# Patient Record
Sex: FEMALE | Race: WHITE | NOT HISPANIC OR LATINO
[De-identification: names, ages, dates, MRNs, and addresses within clinical notes are randomized per-mention and may not be internally consistent; named-entity substitution may affect disease eponyms.]

---

## 2022-09-21 ENCOUNTER — RESULT REVIEW (OUTPATIENT)
Age: 67
End: 2022-09-21

## 2022-11-09 ENCOUNTER — APPOINTMENT (OUTPATIENT)
Dept: RADIATION ONCOLOGY | Facility: CLINIC | Age: 67
End: 2022-11-09

## 2023-02-23 ENCOUNTER — APPOINTMENT (OUTPATIENT)
Dept: FAMILY MEDICINE | Facility: CLINIC | Age: 68
End: 2023-02-23
Payer: MEDICARE

## 2023-02-23 ENCOUNTER — LABORATORY RESULT (OUTPATIENT)
Age: 68
End: 2023-02-23

## 2023-02-23 VITALS
SYSTOLIC BLOOD PRESSURE: 124 MMHG | OXYGEN SATURATION: 98 % | DIASTOLIC BLOOD PRESSURE: 82 MMHG | WEIGHT: 159 LBS | HEIGHT: 65 IN | HEART RATE: 89 BPM | TEMPERATURE: 98 F | BODY MASS INDEX: 26.49 KG/M2

## 2023-02-23 DIAGNOSIS — Z00.00 ENCOUNTER FOR GENERAL ADULT MEDICAL EXAMINATION W/OUT ABNORMAL FINDINGS: ICD-10-CM

## 2023-02-23 DIAGNOSIS — Z13.29 ENCOUNTER FOR SCREENING FOR OTHER SUSPECTED ENDOCRINE DISORDER: ICD-10-CM

## 2023-02-23 DIAGNOSIS — Z13.0 ENCOUNTER FOR SCREENING FOR OTHER SUSPECTED ENDOCRINE DISORDER: ICD-10-CM

## 2023-02-23 DIAGNOSIS — Z13.228 ENCOUNTER FOR SCREENING FOR OTHER SUSPECTED ENDOCRINE DISORDER: ICD-10-CM

## 2023-02-23 PROCEDURE — 99204 OFFICE O/P NEW MOD 45 MIN: CPT

## 2023-02-23 RX ORDER — SITAGLIPTIN AND METFORMIN HYDROCHLORIDE 50; 1000 MG/1; MG/1
50-1000 TABLET, FILM COATED, EXTENDED RELEASE ORAL
Refills: 0 | Status: DISCONTINUED | COMMUNITY
End: 2023-02-23

## 2023-02-23 NOTE — HISTORY OF PRESENT ILLNESS
[de-identified] : PT presenting to establish care \par \par Anxiety- venlafaxine 75 mg daily\par [id number] BID  DM, stable. \par metoprolol- HR elevated \par \par Just recently had a b/l masectomy-> needs to go for reconstruction \par 2001 breast cancer on left, s/p chemo and radiation \par then had reoccurrence of breast cancer in b/l breasts in NOv 2022\par going for reconstruction in approx 3 months\par

## 2023-02-23 NOTE — ASSESSMENT
[FreeTextEntry1] : Pt presenting for annual physical. \par \par Reviewed diet, exercise (150 min/moderate intensity exercise weekly), lifestyle modifications. \par Discussed STD prevention and discussed screening studies per below:\par \par Breast Cancer- Mammogram\par Cervical Cancer- Pap\par Colon Cancer- GI referral \par \par Labs ordered per above. \par f.u in 1 year or earlier if needed \par \par \par RTO for preop clearance for reconstruction

## 2023-02-23 NOTE — HEALTH RISK ASSESSMENT
[Good] : ~his/her~  mood as  good [No] : In the past 12 months have you used drugs other than those required for medical reasons? No [No falls in past year] : Patient reported no falls in the past year [0] : 2) Feeling down, depressed, or hopeless: Not at all (0) [PHQ-2 Negative - No further assessment needed] : PHQ-2 Negative - No further assessment needed [Patient reported mammogram was abnormal] : Patient reported mammogram was abnormal [Patient reported PAP Smear was normal] : Patient reported PAP Smear was normal [Patient reported colonoscopy was normal] : Patient reported colonoscopy was normal [Never] : Never [ColonoscopyDate] : 2020 [ColonoscopyComments] : Q3 years

## 2023-03-02 LAB
25(OH)D3 SERPL-MCNC: 86.8 NG/ML
ALBUMIN SERPL ELPH-MCNC: 4.7 G/DL
ALP BLD-CCNC: 95 U/L
ALT SERPL-CCNC: 21 U/L
ANION GAP SERPL CALC-SCNC: 15 MMOL/L
APPEARANCE: CLEAR
AST SERPL-CCNC: 21 U/L
BASOPHILS # BLD AUTO: 0.07 K/UL
BASOPHILS NFR BLD AUTO: 0.9 %
BILIRUB SERPL-MCNC: 0.4 MG/DL
BILIRUBIN URINE: NEGATIVE
BLOOD URINE: NEGATIVE
BUN SERPL-MCNC: 14 MG/DL
CALCIUM SERPL-MCNC: 10.5 MG/DL
CHLORIDE SERPL-SCNC: 100 MMOL/L
CHOLEST SERPL-MCNC: 162 MG/DL
CO2 SERPL-SCNC: 23 MMOL/L
COLOR: YELLOW
CREAT SERPL-MCNC: 0.77 MG/DL
EGFR: 83 ML/MIN/1.73M2
EOSINOPHIL # BLD AUTO: 0.37 K/UL
EOSINOPHIL NFR BLD AUTO: 4.6 %
ESTIMATED AVERAGE GLUCOSE: 174 MG/DL
GLUCOSE QUALITATIVE U: ABNORMAL
GLUCOSE SERPL-MCNC: 145 MG/DL
HBA1C MFR BLD HPLC: 7.7 %
HCT VFR BLD CALC: 44.8 %
HDLC SERPL-MCNC: 51 MG/DL
HGB BLD-MCNC: 14.3 G/DL
IMM GRANULOCYTES NFR BLD AUTO: 0.2 %
KETONES URINE: NEGATIVE
LDLC SERPL CALC-MCNC: 86 MG/DL
LEUKOCYTE ESTERASE URINE: ABNORMAL
LYMPHOCYTES # BLD AUTO: 2.51 K/UL
LYMPHOCYTES NFR BLD AUTO: 31.2 %
MAN DIFF?: NORMAL
MCHC RBC-ENTMCNC: 28.9 PG
MCHC RBC-ENTMCNC: 31.9 GM/DL
MCV RBC AUTO: 90.5 FL
MONOCYTES # BLD AUTO: 0.63 K/UL
MONOCYTES NFR BLD AUTO: 7.8 %
NEUTROPHILS # BLD AUTO: 4.44 K/UL
NEUTROPHILS NFR BLD AUTO: 55.3 %
NITRITE URINE: NEGATIVE
NONHDLC SERPL-MCNC: 111 MG/DL
PH URINE: 5.5
PLATELET # BLD AUTO: 391 K/UL
POTASSIUM SERPL-SCNC: 4.5 MMOL/L
PROT SERPL-MCNC: 7.3 G/DL
PROTEIN URINE: NORMAL
RBC # BLD: 4.95 M/UL
RBC # FLD: 13.8 %
SODIUM SERPL-SCNC: 137 MMOL/L
SPECIFIC GRAVITY URINE: 1.02
T3 SERPL-MCNC: 157 NG/DL
T4 FREE SERPL-MCNC: 1 NG/DL
TRIGL SERPL-MCNC: 124 MG/DL
TSH SERPL-ACNC: 2.19 UIU/ML
UROBILINOGEN URINE: NORMAL
WBC # FLD AUTO: 8.04 K/UL

## 2023-03-08 RX ORDER — METOPROLOL TARTRATE 50 MG/1
50 TABLET, FILM COATED ORAL DAILY
Qty: 90 | Refills: 1 | Status: DISCONTINUED | COMMUNITY
Start: 1900-01-01 | End: 2023-03-08

## 2023-06-05 ENCOUNTER — APPOINTMENT (OUTPATIENT)
Dept: FAMILY MEDICINE | Facility: CLINIC | Age: 68
End: 2023-06-05
Payer: MEDICARE

## 2023-06-05 VITALS
HEIGHT: 65 IN | TEMPERATURE: 97.3 F | SYSTOLIC BLOOD PRESSURE: 112 MMHG | BODY MASS INDEX: 25.16 KG/M2 | DIASTOLIC BLOOD PRESSURE: 62 MMHG | WEIGHT: 151 LBS | HEART RATE: 106 BPM | OXYGEN SATURATION: 98 %

## 2023-06-05 DIAGNOSIS — Z82.49 FAMILY HISTORY OF ISCHEMIC HEART DISEASE AND OTHER DISEASES OF THE CIRCULATORY SYSTEM: ICD-10-CM

## 2023-06-05 DIAGNOSIS — Z79.4 DIABETES MELLITUS DUE TO UNDERLYING CONDITION WITH OTHER DIABETIC KIDNEY COMPLICATION: ICD-10-CM

## 2023-06-05 DIAGNOSIS — R80.9 DIABETES MELLITUS DUE TO UNDERLYING CONDITION WITH OTHER DIABETIC KIDNEY COMPLICATION: ICD-10-CM

## 2023-06-05 DIAGNOSIS — Z85.3 PERSONAL HISTORY OF MALIGNANT NEOPLASM OF BREAST: ICD-10-CM

## 2023-06-05 DIAGNOSIS — Z78.9 OTHER SPECIFIED HEALTH STATUS: ICD-10-CM

## 2023-06-05 DIAGNOSIS — E08.29 DIABETES MELLITUS DUE TO UNDERLYING CONDITION WITH OTHER DIABETIC KIDNEY COMPLICATION: ICD-10-CM

## 2023-06-05 DIAGNOSIS — Z80.8 FAMILY HISTORY OF MALIGNANT NEOPLASM OF OTHER ORGANS OR SYSTEMS: ICD-10-CM

## 2023-06-05 DIAGNOSIS — Z87.891 PERSONAL HISTORY OF NICOTINE DEPENDENCE: ICD-10-CM

## 2023-06-05 LAB — GLUCOSE BLDC GLUCOMTR-MCNC: 160

## 2023-06-05 PROCEDURE — 99214 OFFICE O/P EST MOD 30 MIN: CPT | Mod: 25

## 2023-06-05 PROCEDURE — 82962 GLUCOSE BLOOD TEST: CPT

## 2023-06-05 NOTE — PHYSICAL EXAM
[No Acute Distress] : no acute distress [Well Nourished] : well nourished [Well Developed] : well developed [Well-Appearing] : well-appearing [Normal Sclera/Conjunctiva] : normal sclera/conjunctiva [PERRL] : pupils equal round and reactive to light [EOMI] : extraocular movements intact [Normal Appearance] : was normal in appearance [Neck Supple] : was supple [No Respiratory Distress] : no respiratory distress  [Clear to Auscultation] : lungs were clear to auscultation bilaterally [Normal Rate] : normal rate  [Regular Rhythm] : with a regular rhythm [Normal S1, S2] : normal S1 and S2 [No Murmur] : no murmur heard [Alert and Oriented x3] : oriented to person, place, and time [Normal Mood] : the mood was normal

## 2023-06-06 ENCOUNTER — OFFICE (OUTPATIENT)
Dept: URBAN - METROPOLITAN AREA CLINIC 102 | Facility: CLINIC | Age: 68
Setting detail: OPHTHALMOLOGY
End: 2023-06-06
Payer: MEDICARE

## 2023-06-06 DIAGNOSIS — H49.22: ICD-10-CM

## 2023-06-06 DIAGNOSIS — H25.13: ICD-10-CM

## 2023-06-06 DIAGNOSIS — H43.813: ICD-10-CM

## 2023-06-06 DIAGNOSIS — E10.9: ICD-10-CM

## 2023-06-06 DIAGNOSIS — E11.9: ICD-10-CM

## 2023-06-06 LAB
ALBUMIN SERPL ELPH-MCNC: 4.4 G/DL
ALP BLD-CCNC: 88 U/L
ALT SERPL-CCNC: 26 U/L
ANION GAP SERPL CALC-SCNC: 16 MMOL/L
APPEARANCE: CLEAR
AST SERPL-CCNC: 32 U/L
BACTERIA: NEGATIVE /HPF
BILIRUB SERPL-MCNC: 0.3 MG/DL
BILIRUBIN URINE: NEGATIVE
BLOOD URINE: NEGATIVE
BUN SERPL-MCNC: 19 MG/DL
CALCIUM SERPL-MCNC: 9.7 MG/DL
CAST: 1 /LPF
CHLORIDE SERPL-SCNC: 103 MMOL/L
CO2 SERPL-SCNC: 21 MMOL/L
COLOR: NORMAL
CREAT SERPL-MCNC: 0.85 MG/DL
CREAT SPEC-SCNC: 250 MG/DL
EGFR: 74 ML/MIN/1.73M2
EPITHELIAL CELLS: 10 /HPF
ESTIMATED AVERAGE GLUCOSE: 223 MG/DL
GLUCOSE QUALITATIVE U: NEGATIVE MG/DL
GLUCOSE SERPL-MCNC: 201 MG/DL
HBA1C MFR BLD HPLC: 9.4 %
KETONES URINE: ABNORMAL MG/DL
LEUKOCYTE ESTERASE URINE: ABNORMAL
MICROALBUMIN 24H UR DL<=1MG/L-MCNC: 2.4 MG/DL
MICROALBUMIN/CREAT 24H UR-RTO: 10 MG/G
MICROSCOPIC-UA: NORMAL
NITRITE URINE: NEGATIVE
PH URINE: 5.5
POTASSIUM SERPL-SCNC: 4.1 MMOL/L
PROT SERPL-MCNC: 7.1 G/DL
PROTEIN URINE: 30 MG/DL
RED BLOOD CELLS URINE: 0 /HPF
SODIUM SERPL-SCNC: 140 MMOL/L
SPECIFIC GRAVITY URINE: 1.03
UROBILINOGEN URINE: 1 MG/DL
WHITE BLOOD CELLS URINE: 36 /HPF

## 2023-06-06 PROCEDURE — 92020 GONIOSCOPY: CPT | Performed by: OPHTHALMOLOGY

## 2023-06-06 PROCEDURE — 92014 COMPRE OPH EXAM EST PT 1/>: CPT | Performed by: OPHTHALMOLOGY

## 2023-06-06 ASSESSMENT — AXIALLENGTH_DERIVED
OD_AL: 22.496
OD_AL: 22.9017
OD_AL: 22.496
OS_AL: 22.4204
OS_AL: 22.9609
OS_AL: 22.4204

## 2023-06-06 ASSESSMENT — TONOMETRY
OS_IOP_MMHG: 21
OD_IOP_MMHG: 21

## 2023-06-06 ASSESSMENT — REFRACTION_CURRENTRX
OS_VPRISM_DIRECTION: SV
OS_SPHERE: +2.75
OD_AXIS: 060
OD_AXIS: 61
OS_SPHERE: +1.25
OS_CYLINDER: -0.25
OD_CYLINDER: -1.00
OS_ADD: +2.25
OS_CYLINDER: -1.00
OD_SPHERE: +1.50
OS_OVR_VA: 20/
OS_AXIS: 080
OD_OVR_VA: 20/
OD_CYLINDER: -0.50
OD_OVR_VA: 20/
OS_AXIS: 69
OD_VPRISM_DIRECTION: SV
OD_ADD: +2.25
OD_SPHERE: +3.75
OS_OVR_VA: 20/

## 2023-06-06 ASSESSMENT — REFRACTION_AUTOREFRACTION
OS_AXIS: 097
OS_CYLINDER: -1.50
OD_CYLINDER: -1.25
OD_AXIS: 081
OS_SPHERE: +2.50
OD_SPHERE: +2.75

## 2023-06-06 ASSESSMENT — CONFRONTATIONAL VISUAL FIELD TEST (CVF)
OS_FINDINGS: FULL
OD_FINDINGS: FULL

## 2023-06-06 ASSESSMENT — VISUAL ACUITY
OD_BCVA: 20/40-1
OS_BCVA: 20/40-1

## 2023-06-06 ASSESSMENT — SPHEQUIV_DERIVED
OS_SPHEQUIV: 1.75
OS_SPHEQUIV: 0.25
OD_SPHEQUIV: 2.125
OD_SPHEQUIV: 1
OD_SPHEQUIV: 2.125
OS_SPHEQUIV: 1.75

## 2023-06-06 ASSESSMENT — KERATOMETRY
OD_K1POWER_DIOPTERS: 44.00
OD_K2POWER_DIOPTERS: 44.75
OS_AXISANGLE_DEGREES: 153
OD_AXISANGLE_DEGREES: 017
METHOD_AUTO_MANUAL: AUTO
OS_K2POWER_DIOPTERS: 45.50
OS_K1POWER_DIOPTERS: 44.50

## 2023-06-06 ASSESSMENT — REFRACTION_MANIFEST
OD_AXIS: 060
OD_CYLINDER: -0.50
OD_SPHERE: +1.25
OD_VA1: 20/25-
OS_SPHERE: +2.50
OS_SPHERE: +0.50
OS_AXIS: 097
OS_VA1: 20/25
OD_SPHERE: +2.75
OD_ADD: +2.50
OS_ADD: +2.50
OS_CYLINDER: -0.50
OD_VA1: 20/25
OD_CYLINDER: -1.25
OS_CYLINDER: -1.50
OS_VA1: 20/25-
OD_AXIS: 081
OS_AXIS: 090

## 2023-06-13 ENCOUNTER — RX CHANGE (OUTPATIENT)
Age: 68
End: 2023-06-13

## 2023-06-13 ENCOUNTER — APPOINTMENT (OUTPATIENT)
Dept: FAMILY MEDICINE | Facility: CLINIC | Age: 68
End: 2023-06-13

## 2023-06-13 RX ORDER — ONDANSETRON 4 MG/1
4 TABLET, ORALLY DISINTEGRATING ORAL EVERY 6 HOURS
Qty: 15 | Refills: 1 | Status: ACTIVE | COMMUNITY
Start: 2023-06-13 | End: 1900-01-01

## 2023-06-14 ENCOUNTER — RX CHANGE (OUTPATIENT)
Age: 68
End: 2023-06-14

## 2023-06-14 RX ORDER — VENLAFAXINE 75 MG/1
75 TABLET ORAL DAILY
Qty: 90 | Refills: 0 | Status: DISCONTINUED | COMMUNITY
End: 2023-06-14

## 2023-06-16 ENCOUNTER — OFFICE (OUTPATIENT)
Dept: URBAN - METROPOLITAN AREA CLINIC 115 | Facility: CLINIC | Age: 68
Setting detail: OPHTHALMOLOGY
End: 2023-06-16
Payer: MEDICARE

## 2023-06-16 DIAGNOSIS — H49.22: ICD-10-CM

## 2023-06-16 DIAGNOSIS — H53.433: ICD-10-CM

## 2023-06-16 DIAGNOSIS — H25.13: ICD-10-CM

## 2023-06-16 DIAGNOSIS — E11.9: ICD-10-CM

## 2023-06-16 DIAGNOSIS — E10.9: ICD-10-CM

## 2023-06-16 DIAGNOSIS — H50.00: ICD-10-CM

## 2023-06-16 DIAGNOSIS — H43.813: ICD-10-CM

## 2023-06-16 PROCEDURE — 99214 OFFICE O/P EST MOD 30 MIN: CPT | Performed by: OPHTHALMOLOGY

## 2023-06-16 PROCEDURE — 92083 EXTENDED VISUAL FIELD XM: CPT | Performed by: OPHTHALMOLOGY

## 2023-06-16 PROCEDURE — 92133 CPTRZD OPH DX IMG PST SGM ON: CPT | Performed by: OPHTHALMOLOGY

## 2023-06-16 ASSESSMENT — SPHEQUIV_DERIVED
OD_SPHEQUIV: 1
OS_SPHEQUIV: 0.25
OD_SPHEQUIV: 2
OS_SPHEQUIV: 1.5
OS_SPHEQUIV: 1.75
OD_SPHEQUIV: 2.125

## 2023-06-16 ASSESSMENT — REFRACTION_AUTOREFRACTION
OD_CYLINDER: -1.00
OS_AXIS: 089
OD_AXIS: 072
OS_SPHERE: +2.25
OD_SPHERE: +2.50
OS_CYLINDER: -1.50

## 2023-06-16 ASSESSMENT — REFRACTION_MANIFEST
OD_SPHERE: +1.25
OS_VA1: 20/25
OS_VA1: 20/25-
OD_ADD: +2.50
OD_AXIS: 081
OD_SPHERE: +2.75
OS_SPHERE: +2.50
OS_CYLINDER: -0.50
OS_CYLINDER: -1.50
OS_AXIS: 097
OD_CYLINDER: -1.25
OD_VA1: 20/25
OS_SPHERE: +0.50
OD_CYLINDER: -0.50
OD_VA1: 20/25-
OS_AXIS: 090
OS_ADD: +2.50
OD_AXIS: 060

## 2023-06-16 ASSESSMENT — AXIALLENGTH_DERIVED
OD_AL: 22.496
OS_AL: 22.9609
OS_AL: 22.5087
OS_AL: 22.4204
OD_AL: 22.5404
OD_AL: 22.9017

## 2023-06-16 ASSESSMENT — REFRACTION_CURRENTRX
OS_AXIS: 080
OD_VPRISM_DIRECTION: SV
OD_AXIS: 060
OD_OVR_VA: 20/
OS_OVR_VA: 20/
OS_ADD: +2.25
OS_CYLINDER: -0.25
OD_SPHERE: +3.75
OS_SPHERE: +2.75
OD_ADD: +2.25
OD_OVR_VA: 20/
OD_SPHERE: +1.50
OD_AXIS: 61
OS_OVR_VA: 20/
OS_VPRISM_DIRECTION: SV
OS_CYLINDER: -1.00
OS_SPHERE: +1.25
OS_AXIS: 69
OD_CYLINDER: -1.00
OD_CYLINDER: -0.50

## 2023-06-16 ASSESSMENT — KERATOMETRY
OS_AXISANGLE_DEGREES: 153
OS_K2POWER_DIOPTERS: 45.50
OS_K1POWER_DIOPTERS: 44.50
OD_K2POWER_DIOPTERS: 44.75
METHOD_AUTO_MANUAL: AUTO
OD_K1POWER_DIOPTERS: 44.00
OD_AXISANGLE_DEGREES: 017

## 2023-06-16 ASSESSMENT — VISUAL ACUITY
OS_BCVA: 20/20-
OD_BCVA: 20/60-

## 2023-06-16 ASSESSMENT — CONFRONTATIONAL VISUAL FIELD TEST (CVF)
OD_FINDINGS: FULL
OS_FINDINGS: FULL

## 2023-06-16 ASSESSMENT — TONOMETRY
OD_IOP_MMHG: 20
OS_IOP_MMHG: 20

## 2023-06-19 NOTE — ASSESSMENT
[FreeTextEntry1] : POCT glucose 160- stable\par currently on Janumet for diabetes- last A1C 7.7, not controlled\par will check blood work, reassess \par improve upon diet and exercise\par scheduled to see ophthalmology \par BP stable- c/w Metoprolol

## 2023-06-19 NOTE — HISTORY OF PRESENT ILLNESS
[FreeTextEntry8] : \par 68 year old female with HTN, Hyperlipidemia, DM II, Anxiety, Breast Cancer s/p lumpectomy, chemo/radiation, had recurrence of cancer s/p bilateral mastectomy, follows with Dr. Mccann, oncology, presents c/o blurry vision for the past 2 days. She reports having double vision. She is scheduled to see her ophthalmologist tomorrow. She is concerned about her sugars. She has been taking Janumet for her diabetes-last A1C 7.7 from Feb 2023. She reports her diet has not been great over the past of couple months. She has had bilateral mastectomy, has expanders in place, will need reconstructive surgery.

## 2023-06-28 PROBLEM — E11.9 DIABETES TYPE 2 NO RETINOPATHY ; BOTH EYES: Status: ACTIVE | Noted: 2023-06-06

## 2023-06-28 PROBLEM — H50.00 ESOTROPIA, UNSPECIFIED: Status: ACTIVE | Noted: 2023-06-16

## 2023-06-28 PROBLEM — H53.433 ARCUATE DEFECT; BOTH EYES: Status: ACTIVE | Noted: 2023-06-16

## 2023-06-28 PROBLEM — H43.813 POSTERIOR VITREOUS DETACHMENT; BOTH EYES: Status: ACTIVE | Noted: 2023-06-06

## 2023-07-12 ENCOUNTER — OFFICE (OUTPATIENT)
Dept: URBAN - METROPOLITAN AREA CLINIC 115 | Facility: CLINIC | Age: 68
Setting detail: OPHTHALMOLOGY
End: 2023-07-12
Payer: MEDICARE

## 2023-07-12 DIAGNOSIS — H25.13: ICD-10-CM

## 2023-07-12 DIAGNOSIS — E10.9: ICD-10-CM

## 2023-07-12 DIAGNOSIS — H49.22: ICD-10-CM

## 2023-07-12 DIAGNOSIS — H50.00: ICD-10-CM

## 2023-07-12 DIAGNOSIS — H53.433: ICD-10-CM

## 2023-07-12 DIAGNOSIS — H47.322: ICD-10-CM

## 2023-07-12 PROCEDURE — 92250 FUNDUS PHOTOGRAPHY W/I&R: CPT | Performed by: OPHTHALMOLOGY

## 2023-07-12 PROCEDURE — 99213 OFFICE O/P EST LOW 20 MIN: CPT | Performed by: OPHTHALMOLOGY

## 2023-07-12 PROCEDURE — 92083 EXTENDED VISUAL FIELD XM: CPT | Performed by: OPHTHALMOLOGY

## 2023-07-12 ASSESSMENT — AXIALLENGTH_DERIVED
OS_AL: 22.6874
OD_AL: 22.9017
OD_AL: 22.496
OS_AL: 22.9609
OD_AL: 22.5404
OS_AL: 22.4204

## 2023-07-12 ASSESSMENT — REFRACTION_CURRENTRX
OS_OVR_VA: 20/
OD_CYLINDER: -0.50
OS_VPRISM_DIRECTION: SV
OD_SPHERE: +3.75
OS_CYLINDER: -0.25
OS_CYLINDER: -1.00
OD_ADD: +2.25
OS_ADD: +2.25
OD_OVR_VA: 20/
OD_OVR_VA: 20/
OS_SPHERE: +1.25
OS_AXIS: 69
OS_SPHERE: +2.75
OD_CYLINDER: -1.00
OD_AXIS: 060
OS_AXIS: 080
OD_SPHERE: +1.50
OD_VPRISM_DIRECTION: SV
OD_AXIS: 61
OS_OVR_VA: 20/

## 2023-07-12 ASSESSMENT — REFRACTION_AUTOREFRACTION
OS_CYLINDER: -0.50
OD_CYLINDER: -1.00
OS_SPHERE: +1.25
OD_SPHERE: +2.50
OS_AXIS: 104
OD_AXIS: 076

## 2023-07-12 ASSESSMENT — VISUAL ACUITY
OD_BCVA: 20/60-
OS_BCVA: 20/20-

## 2023-07-12 ASSESSMENT — SPHEQUIV_DERIVED
OS_SPHEQUIV: 1.75
OS_SPHEQUIV: 0.25
OD_SPHEQUIV: 2
OS_SPHEQUIV: 1
OD_SPHEQUIV: 1
OD_SPHEQUIV: 2.125

## 2023-07-12 ASSESSMENT — REFRACTION_MANIFEST
OD_SPHERE: +2.75
OS_VA1: 20/25-
OS_CYLINDER: -1.50
OS_VA1: 20/25
OD_VA1: 20/25-
OS_ADD: +2.50
OS_SPHERE: +0.50
OD_AXIS: 060
OS_AXIS: 097
OD_VA1: 20/25
OD_SPHERE: +1.25
OD_ADD: +2.50
OD_AXIS: 081
OD_CYLINDER: -0.50
OS_CYLINDER: -0.50
OS_AXIS: 090
OS_SPHERE: +2.50
OD_CYLINDER: -1.25

## 2023-07-12 ASSESSMENT — CONFRONTATIONAL VISUAL FIELD TEST (CVF)
OD_FINDINGS: FULL
OS_FINDINGS: FULL

## 2023-07-12 ASSESSMENT — KERATOMETRY
OS_K2POWER_DIOPTERS: 45.50
METHOD_AUTO_MANUAL: AUTO
OD_K1POWER_DIOPTERS: 44.00
OS_AXISANGLE_DEGREES: 153
OS_K1POWER_DIOPTERS: 44.50
OD_AXISANGLE_DEGREES: 017
OD_K2POWER_DIOPTERS: 44.75

## 2023-07-14 ENCOUNTER — APPOINTMENT (OUTPATIENT)
Dept: FAMILY MEDICINE | Facility: CLINIC | Age: 68
End: 2023-07-14
Payer: MEDICARE

## 2023-07-14 VITALS
TEMPERATURE: 97.4 F | HEIGHT: 65 IN | WEIGHT: 150 LBS | OXYGEN SATURATION: 98 % | BODY MASS INDEX: 24.99 KG/M2 | DIASTOLIC BLOOD PRESSURE: 70 MMHG | SYSTOLIC BLOOD PRESSURE: 118 MMHG | HEART RATE: 89 BPM

## 2023-07-14 DIAGNOSIS — H53.8 OTHER VISUAL DISTURBANCES: ICD-10-CM

## 2023-07-14 PROCEDURE — 99214 OFFICE O/P EST MOD 30 MIN: CPT

## 2023-07-14 NOTE — ASSESSMENT
[FreeTextEntry1] : uncontrolled DM \par discussed lifestyle changes \par had eye exam, blurry vision is not 2/2 DM at this time however discussed long term effects of this. \par \par Increase mounjaro to 5 mg weekly\par pt tolerating well\par f/u in 3 months for A1c check and mounjaro refill. \par

## 2023-07-14 NOTE — HISTORY OF PRESENT ILLNESS
[de-identified] : PT presenting for f/u \par \par Since last visit, has developed double vision\par had spot on front of head via MRI and was recommended to see neurosurgeon recommended by Ophtho\par fam hx of AVM, \par \par Currently on Mounjaro, last A1c was 9.4, increased from 7.7 in Feb

## 2023-07-28 ENCOUNTER — NON-APPOINTMENT (OUTPATIENT)
Age: 68
End: 2023-07-28

## 2023-07-29 ENCOUNTER — TRANSCRIPTION ENCOUNTER (OUTPATIENT)
Age: 68
End: 2023-07-29

## 2023-08-08 ENCOUNTER — RX RENEWAL (OUTPATIENT)
Age: 68
End: 2023-08-08

## 2023-08-16 ENCOUNTER — APPOINTMENT (OUTPATIENT)
Dept: SPINE | Facility: CLINIC | Age: 68
End: 2023-08-16
Payer: MEDICARE

## 2023-08-16 VITALS
HEIGHT: 65 IN | BODY MASS INDEX: 24.99 KG/M2 | DIASTOLIC BLOOD PRESSURE: 79 MMHG | SYSTOLIC BLOOD PRESSURE: 126 MMHG | HEART RATE: 80 BPM | OXYGEN SATURATION: 97 % | WEIGHT: 150 LBS

## 2023-08-16 DIAGNOSIS — D32.9 BENIGN NEOPLASM OF MENINGES, UNSPECIFIED: ICD-10-CM

## 2023-08-16 PROCEDURE — 99204 OFFICE O/P NEW MOD 45 MIN: CPT

## 2023-10-04 ENCOUNTER — APPOINTMENT (OUTPATIENT)
Dept: SPINE | Facility: CLINIC | Age: 68
End: 2023-10-04

## 2023-10-17 ENCOUNTER — APPOINTMENT (OUTPATIENT)
Dept: FAMILY MEDICINE | Facility: CLINIC | Age: 68
End: 2023-10-17

## 2023-10-24 ENCOUNTER — APPOINTMENT (OUTPATIENT)
Dept: FAMILY MEDICINE | Facility: CLINIC | Age: 68
End: 2023-10-24
Payer: MEDICARE

## 2023-10-24 VITALS
HEART RATE: 85 BPM | HEIGHT: 65 IN | DIASTOLIC BLOOD PRESSURE: 80 MMHG | WEIGHT: 146 LBS | SYSTOLIC BLOOD PRESSURE: 124 MMHG | OXYGEN SATURATION: 98 % | BODY MASS INDEX: 24.32 KG/M2 | TEMPERATURE: 97.3 F

## 2023-10-24 PROCEDURE — 99214 OFFICE O/P EST MOD 30 MIN: CPT

## 2023-10-24 RX ORDER — TIRZEPATIDE 2.5 MG/.5ML
2.5 INJECTION, SOLUTION SUBCUTANEOUS
Qty: 1 | Refills: 0 | Status: DISCONTINUED | COMMUNITY
Start: 2023-08-08 | End: 2023-10-24

## 2023-10-24 RX ORDER — TIRZEPATIDE 5 MG/.5ML
5 INJECTION, SOLUTION SUBCUTANEOUS
Qty: 1 | Refills: 3 | Status: DISCONTINUED | COMMUNITY
Start: 2023-06-07 | End: 2023-10-24

## 2023-10-26 LAB
ALBUMIN SERPL ELPH-MCNC: 4.2 G/DL
ALP BLD-CCNC: 69 U/L
ALT SERPL-CCNC: 20 U/L
ANION GAP SERPL CALC-SCNC: 12 MMOL/L
AST SERPL-CCNC: 18 U/L
BILIRUB SERPL-MCNC: 0.3 MG/DL
BUN SERPL-MCNC: 16 MG/DL
CALCIUM SERPL-MCNC: 9.9 MG/DL
CHLORIDE SERPL-SCNC: 102 MMOL/L
CHOLEST SERPL-MCNC: 137 MG/DL
CO2 SERPL-SCNC: 24 MMOL/L
CREAT SERPL-MCNC: 0.75 MG/DL
EGFR: 86 ML/MIN/1.73M2
ESTIMATED AVERAGE GLUCOSE: 154 MG/DL
GLUCOSE SERPL-MCNC: 149 MG/DL
HBA1C MFR BLD HPLC: 7 %
HDLC SERPL-MCNC: 59 MG/DL
LDLC SERPL CALC-MCNC: 60 MG/DL
NONHDLC SERPL-MCNC: 78 MG/DL
POTASSIUM SERPL-SCNC: 4.6 MMOL/L
PROT SERPL-MCNC: 6.7 G/DL
SODIUM SERPL-SCNC: 138 MMOL/L
TRIGL SERPL-MCNC: 96 MG/DL

## 2023-12-14 ENCOUNTER — APPOINTMENT (OUTPATIENT)
Dept: FAMILY MEDICINE | Facility: CLINIC | Age: 68
End: 2023-12-14
Payer: MEDICARE

## 2023-12-14 VITALS
OXYGEN SATURATION: 99 % | BODY MASS INDEX: 24.99 KG/M2 | TEMPERATURE: 97 F | HEART RATE: 88 BPM | HEIGHT: 65 IN | SYSTOLIC BLOOD PRESSURE: 120 MMHG | DIASTOLIC BLOOD PRESSURE: 60 MMHG | WEIGHT: 150 LBS

## 2023-12-14 DIAGNOSIS — E78.5 HYPERLIPIDEMIA, UNSPECIFIED: ICD-10-CM

## 2023-12-14 DIAGNOSIS — E11.9 TYPE 2 DIABETES MELLITUS W/OUT COMPLICATIONS: ICD-10-CM

## 2023-12-14 DIAGNOSIS — F41.9 ANXIETY DISORDER, UNSPECIFIED: ICD-10-CM

## 2023-12-14 PROCEDURE — 99214 OFFICE O/P EST MOD 30 MIN: CPT

## 2023-12-14 RX ORDER — VENLAFAXINE HYDROCHLORIDE 75 MG/1
75 CAPSULE, EXTENDED RELEASE ORAL
Qty: 90 | Refills: 1 | Status: ACTIVE | COMMUNITY
Start: 2023-06-14 | End: 1900-01-01

## 2023-12-14 RX ORDER — SITAGLIPTIN AND METFORMIN HYDROCHLORIDE 50; 1000 MG/1; MG/1
50-1000 TABLET, FILM COATED ORAL TWICE DAILY
Qty: 180 | Refills: 1 | Status: ACTIVE | COMMUNITY
Start: 2023-02-23 | End: 1900-01-01

## 2023-12-14 NOTE — ASSESSMENT
[FreeTextEntry1] : DM II - most recently A1c 7.0- controlled.  - off of Mounjaro secondary from nausea - c/w Janumet - c/w diet and exercise as tolerated - check blood work  Hyperlipidemia - previously stable - c/w Atorvastatin - c/w diet and exercise as tolerated - check blood work  HTN - stable - c/w Metoprolol  Anxiety - stable - c/w Venlafaxine  Right arm pain - secondary from fall - recommend Tylenol for pain control - can apply heat to affected area - f/u if no improvement, consider imaging versus orthopedic evaluation   Grief: advised therapy, cont effexor

## 2023-12-14 NOTE — HISTORY OF PRESENT ILLNESS
[FreeTextEntry1] : Follow up [de-identified] : 68 year old female with HTN, Hyperlipidemia, DM II, Anxiety, Breast Cancer s/p lumpectomy, chemo/radiation, had recurrence of cancer s/p bilateral mastectomy, presents for a follow up.  Diabetic- last A1C 7.0 from 10/2023 currently on Janumet believes her diet has improved reports blurry vision has resolved- did see ophthalmology here to check updated blood work  has Hyperlipidemia- on Atorvastatin  has HTN- on Metoprolol  has Anxiety- on Venlafaxine   had MRI of brain- found to have a meningioma  seen by neurosurgery  declines Flu vaccine  reports slipping and falling at home last week, landed on her right side did not hit her head reports mild pain to her right arm, improving slowly  her son-in-law passed away recently from suicide in [place] due to overdose of Helium, still distraught

## 2023-12-14 NOTE — PHYSICAL EXAM
[No Acute Distress] : no acute distress [Well Nourished] : well nourished [Well Developed] : well developed [Well-Appearing] : well-appearing [Normal Appearance] : was normal in appearance [Neck Supple] : was supple [No Respiratory Distress] : no respiratory distress  [Clear to Auscultation] : lungs were clear to auscultation bilaterally [Normal Rate] : normal rate  [Regular Rhythm] : with a regular rhythm [Normal S1, S2] : normal S1 and S2 [No Murmur] : no murmur heard [Soft] : abdomen soft [Non Tender] : non-tender [Alert and Oriented x3] : oriented to person, place, and time [Appropriate] : appropriate [de-identified] : + mild tenderness to right arm, no swelling [de-identified] : no bruising to right arm

## 2023-12-15 LAB
ALBUMIN SERPL ELPH-MCNC: 4.4 G/DL
ALP BLD-CCNC: 82 U/L
ALT SERPL-CCNC: 14 U/L
ANION GAP SERPL CALC-SCNC: 12 MMOL/L
AST SERPL-CCNC: 16 U/L
BILIRUB SERPL-MCNC: 0.3 MG/DL
BUN SERPL-MCNC: 16 MG/DL
CALCIUM SERPL-MCNC: 10.3 MG/DL
CHLORIDE SERPL-SCNC: 100 MMOL/L
CHOLEST SERPL-MCNC: 147 MG/DL
CO2 SERPL-SCNC: 26 MMOL/L
CREAT SERPL-MCNC: 0.81 MG/DL
EGFR: 78 ML/MIN/1.73M2
ESTIMATED AVERAGE GLUCOSE: 166 MG/DL
GLUCOSE SERPL-MCNC: 201 MG/DL
HBA1C MFR BLD HPLC: 7.4 %
HDLC SERPL-MCNC: 53 MG/DL
LDLC SERPL CALC-MCNC: 71 MG/DL
NONHDLC SERPL-MCNC: 94 MG/DL
POTASSIUM SERPL-SCNC: 4.2 MMOL/L
PROT SERPL-MCNC: 7 G/DL
SODIUM SERPL-SCNC: 138 MMOL/L
TRIGL SERPL-MCNC: 132 MG/DL

## 2024-01-25 ENCOUNTER — NON-APPOINTMENT (OUTPATIENT)
Age: 69
End: 2024-01-25

## 2024-01-25 ENCOUNTER — APPOINTMENT (OUTPATIENT)
Dept: FAMILY MEDICINE | Facility: CLINIC | Age: 69
End: 2024-01-25
Payer: MEDICARE

## 2024-01-25 VITALS
DIASTOLIC BLOOD PRESSURE: 76 MMHG | SYSTOLIC BLOOD PRESSURE: 140 MMHG | HEART RATE: 90 BPM | BODY MASS INDEX: 24.99 KG/M2 | TEMPERATURE: 97.5 F | OXYGEN SATURATION: 98 % | WEIGHT: 150 LBS | HEIGHT: 65 IN
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
## 2024-01-25 DIAGNOSIS — I10 ESSENTIAL (PRIMARY) HYPERTENSION: ICD-10-CM

## 2024-01-25 PROCEDURE — 99214 OFFICE O/P EST MOD 30 MIN: CPT

## 2024-01-25 RX ORDER — DOXYCYCLINE HYCLATE 100 MG/1
100 TABLET ORAL TWICE DAILY
Qty: 4 | Refills: 0 | Status: ACTIVE | COMMUNITY
Start: 2024-01-25 | End: 1900-01-01

## 2024-01-31 LAB
A PHAGOCYTOPH IGG TITR SER IF: NORMAL
B BURGDOR AB SER QL IA: 0.29 IV
B MICROTI IGG TITR SER: NORMAL
E CHAFFEENSIS IGG TITR SER IF: NORMAL

## 2024-02-05 ENCOUNTER — APPOINTMENT (OUTPATIENT)
Dept: ORTHOPEDIC SURGERY | Facility: CLINIC | Age: 69
End: 2024-02-05
Payer: MEDICARE

## 2024-02-05 VITALS — WEIGHT: 150 LBS | HEIGHT: 65 IN | BODY MASS INDEX: 24.99 KG/M2

## 2024-02-05 PROCEDURE — 99204 OFFICE O/P NEW MOD 45 MIN: CPT

## 2024-02-05 PROCEDURE — 73060 X-RAY EXAM OF HUMERUS: CPT | Mod: RT

## 2024-02-05 PROCEDURE — 73030 X-RAY EXAM OF SHOULDER: CPT | Mod: RT

## 2024-02-05 RX ORDER — MELOXICAM 15 MG/1
15 TABLET ORAL DAILY
Qty: 30 | Refills: 0 | Status: ACTIVE | COMMUNITY
Start: 2024-02-05 | End: 1900-01-01

## 2024-02-05 NOTE — DATA REVIEWED
[FreeTextEntry1] : 02/05/24 OC X RAY right humerus 2 view: unremarkable   OC X-Ray Examination of the RIGHT SHOULDER: 3+ views: unremarkable

## 2024-02-05 NOTE — HISTORY OF PRESENT ILLNESS
[de-identified] : The patient is a 68 year old RIGHT hand dominant female who presents today complaining of R shoulder pain.   Date of Injury/Onset: ~09/23 Pain:    At Rest: 0/10  With Activity:  8/10  Mechanism of injury: Patient reported slipping and landed on the involved arm on cement.  This is NOT a Work Related Injury being treated under Worker's Compensation. This is NOT an athletic injury occurring associated with an interscholastic or organized sports team. Quality of symptoms: sharp Improves with: N/A Worse with: reaching for objects Prior treatment: HEP, topical analgesic, PCP Prior Imaging: N/A Out of work/sport: _, since _ School/Sport/Position/Occupation: Retired Additional Information: None

## 2024-02-05 NOTE — DISCUSSION/SUMMARY
[de-identified] : MRI of the right shoulder to eval Rotator cuff tear. Rx Mobic Follow up after  scan.   **patient is diabetic   ----------------------------------------------- Home Exercise The patient is instructed on a home exercise program.  JESSE MAXWELL Acting as a Scribe for Dr. Christ MONTOYA, Jesse Maxwell, attest that this documentation has been prepared under the direction and in the presence of Provider Brandon Polo MD.  Activity Modification The patient was advised to modify their activities.  Dx / Natural History The patient was advised of the diagnosis.  The natural history of the pathology was explained in full to the patient in layman's terms.  Several different treatment options were discussed and explained in full to the patient including the risks and benefits of both surgical and non-surgical treatments.  All questions and concerns were answered.  Pain Guide Activities The patient was advised to let pain guide the gradual advancement of activities.  HELEN MONTOYA explained to the patient that rest, ice, compression, and elevation would benefit them.  They may return to activity after follow-up or when they no longer have any pain.  The patient's current medication management of their orthopedic diagnosis was reviewed today: (1) We discussed a comprehensive treatment plan that included possible pharmaceutical management involving the use of prescription strength medications including but not limited to options such as oral Naprosyn 500mg BID, once daily Meloxicam 15 mg, or 500-650 mg Tylenol versus over the counter oral medications and topical prescription NSAID Pennsaid vs over the counter Voltaren gel. (2) There is a moderate risk of morbidity with further treatment, especially from use of prescription strength medications and possible side effects of these medications which include upset stomach with oral medications, skin reactions to topical medications and cardiac/renal issues with long term use. (3) I recommended that the patient follow-up with their medical physician to discuss any significant specific potential issues with long term medication use such as interactions with current medications or with exacerbation of underlying medical comorbidities. (4) The benefits and risks associated with use of injectable, oral or topical, prescription and over the counter anti-inflammatory medications were discussed with the patient. The patient voiced understanding of the risks including but not limited to bleeding, stroke, kidney dysfunction, heart disease, and were referred to the black box warning label for further information.

## 2024-02-05 NOTE — PHYSICAL EXAM
[de-identified] : Neurologic: normal sensation, normal mood and affect, orientated and able to communicate  Right Shoulder/arm: +Impingement test +Neer test +King test +Davion sign 4-/5 Supraspinatus Strength

## 2024-02-16 RX ORDER — ALPRAZOLAM 0.5 MG/1
0.5 TABLET ORAL
Qty: 2 | Refills: 0 | Status: ACTIVE | COMMUNITY
Start: 2024-02-16 | End: 1900-01-01

## 2024-02-21 ENCOUNTER — RESULT REVIEW (OUTPATIENT)
Age: 69
End: 2024-02-21

## 2024-02-26 ENCOUNTER — APPOINTMENT (OUTPATIENT)
Dept: ORTHOPEDIC SURGERY | Facility: CLINIC | Age: 69
End: 2024-02-26
Payer: MEDICARE

## 2024-02-26 VITALS — WEIGHT: 150 LBS | HEIGHT: 65 IN | BODY MASS INDEX: 24.99 KG/M2

## 2024-02-26 DIAGNOSIS — M79.18 MYALGIA, OTHER SITE: ICD-10-CM

## 2024-02-26 DIAGNOSIS — M79.601 PAIN IN RIGHT ARM: ICD-10-CM

## 2024-02-26 PROCEDURE — 99214 OFFICE O/P EST MOD 30 MIN: CPT

## 2024-02-26 NOTE — DISCUSSION/SUMMARY
[de-identified] : Surgical Consent:  -Conservative treatment, nontreatment, nonsurgical intervention and surgical intervention treatment options have been reviewed with the patient.  The patient continues to be symptomatic [and has failed conservative treatment], and elects to move forward with surgical intervention.  The patient is indicated for right shoulder arthroscopic rotator cuff repair, possible augmentation with regeneten patch, arthroscopic proximal biceps tenodesis, debridement and subacromial decompression with acromioplasty and all indicated procedures. As such the alternatives, benefits and risks, of the above procedure, including but not limited to bleeding, infection, neurovascular injury, loss of limb, loss of life,  DVT, PE, RSD, inability to return to previous level of activity, inability to return to previous level of employment, advancement of or to osteoarthritic changes, joint instability or motion loss, hardware failure or migration,  failure to resolve all symptoms, failure to return to sports and need for further procedures, as well as specific risk of [re-tear, arthrofibrosis, OA] were discussed with the patient and/or their legal guardian who agreed to move forward with surgical intervention.  They have reviewed and signed the consent form today after expressing understanding of the above documented conversation. The patient or their representative will contact my office as instructed on the preoperative instruction sheet they received today to schedule surgery in a timely manner as discussed.  -As a post-operative protocol, I am prescribing an iceless cold/heat compression therapy device for at home use to be used 3-5 times per day at 40 degrees for 35 days as an alternative to pain medication. I would like my patient to begin with simultaneous cold & compression therapy at 10mm pressure. At the patients follow up I will determine whether they should continue with cold, or if they should transition to contrast cold/heat compression therapy. Unlike a conventional cold therapy unit that requires ice, the ThermX iceless device is set to a prescribed temperature that it will remain throughout the entire duration of use, whether that be cold compression, heat compression, or contrast compression. Cold therapy units that depend on ice melt over a very short period and do not provide compression which limits the compliance and effectiveness for pain/inflammation reduction that I am targeting for my patient. I have reached out to [facility] [facility] [place] to supply this device as they are the exclusive provider of the ThermX and the patient will be contacted and instructed on how to utilize the device. ------------    ----------------------------------------------- Home Exercise The patient is instructed on a home exercise program.  JESSE MAXWELL Acting as a Scribe for Dr. Christ MONTOYA, Jesse Maxwell, attest that this documentation has been prepared under the direction and in the presence of Provider Brandon Polo MD.  Activity Modification The patient was advised to modify their activities.  Dx / Natural History The patient was advised of the diagnosis.  The natural history of the pathology was explained in full to the patient in layman's terms.  Several different treatment options were discussed and explained in full to the patient including the risks and benefits of both surgical and non-surgical treatments.  All questions and concerns were answered.  Pain Guide Activities The patient was advised to let pain guide the gradual advancement of activities.  RICE I explained to the patient that rest, ice, compression, and elevation would benefit them.  They may return to activity after follow-up or when they no longer have any pain.  The patient's current medication management of their orthopedic diagnosis was reviewed today: (1) We discussed a comprehensive treatment plan that included possible pharmaceutical management involving the use of prescription strength medications including but not limited to options such as oral Naprosyn 500mg BID, once daily Meloxicam 15 mg, or 500-650 mg Tylenol versus over the counter oral medications and topical prescription NSAID Pennsaid vs over the counter Voltaren gel. (2) There is a moderate risk of morbidity with further treatment, especially from use of prescription strength medications and possible side effects of these medications which include upset stomach with oral medications, skin reactions to topical medications and cardiac/renal issues with long term use. (3) I recommended that the patient follow-up with their medical physician to discuss any significant specific potential issues with long term medication use such as interactions with current medications or with exacerbation of underlying medical comorbidities. (4) The benefits and risks associated with use of injectable, oral or topical, prescription and over the counter anti-inflammatory medications were discussed with the patient. The patient voiced understanding of the risks including but not limited to bleeding, stroke, kidney dysfunction, heart disease, and were referred to the black box warning label for further information.

## 2024-02-26 NOTE — DATA REVIEWED
[FreeTextEntry1] : 02/05/24 OC X RAY right humerus 2 view: unremarkable   OC X-Ray Examination of the RIGHT SHOULDER: 3+ views: unremarkable   02/21/24 [facility] MRI Right shoulder: Motion limited study.  Complete supraspinatus tendon tear with medial retraction of the torn fibers and moderate subacromial-subdeltoid bursitis. Partial infraspinatus tendon tear. Moderate biceps tendinosis and partial tear with mild tenosynovitis. Degenerative changes.

## 2024-02-26 NOTE — HISTORY OF PRESENT ILLNESS
[de-identified] : The patient is a 68 year old RIGHT hand dominant female who presents today complaining of R shoulder pain.   Date of Injury/Onset: ~09/23 Pain:    At Rest: 0/10  With Activity:  8/10  Mechanism of injury: Patient reported slipping and landed on the involved arm on cement.  This is NOT a Work Related Injury being treated under Worker's Compensation. This is NOT an athletic injury occurring associated with an interscholastic or organized sports team. Quality of symptoms: sharp Improves with: N/A Worse with: reaching for objects Prior treatment: HEP, topical analgesic, PCP Prior Imaging: N/A Out of work/sport: _, since _ School/Sport/Position/Occupation: Retired Additional Information: None

## 2024-02-26 NOTE — PHYSICAL EXAM
[de-identified] : Neurologic: normal sensation, normal mood and affect, orientated and able to communicate  Right Shoulder/arm: +Impingement test +Neer test +King test +Davion sign 4-/5 Supraspinatus Strength

## 2024-03-11 ENCOUNTER — APPOINTMENT (OUTPATIENT)
Dept: ORTHOPEDIC SURGERY | Facility: CLINIC | Age: 69
End: 2024-03-11
Payer: MEDICARE

## 2024-03-11 DIAGNOSIS — M75.121 COMPLETE ROTATOR CUFF TEAR OR RUPTURE OF RIGHT SHOULDER, NOT SPECIFIED AS TRAUMATIC: ICD-10-CM

## 2024-03-11 DIAGNOSIS — M25.511 PAIN IN RIGHT SHOULDER: ICD-10-CM

## 2024-03-11 PROCEDURE — 99214 OFFICE O/P EST MOD 30 MIN: CPT

## 2024-03-11 NOTE — IMAGING
[de-identified] : (Exam: Shoulder)   Laterality is right    Patient is in no acute distress, alert and oriented Sensation is grossly intact to light touch in the hand Motor function is 5/5 in the hand Capillary refill is less than 2 seconds in the fingers Lymphadenopathy is not present Peripheral edema is not present   Skin is intact Swelling is not present Atrophy is not present Scapular winging is not present Deformity of the AC joint is not present Deformity of the biceps is not present   Bicipital groove tenderness is present AC joint tenderness is not present Scapulothoracic tenderness is not present   Active forward elevation is 140 Passive forward elevation is 160 External rotation at the side is 40 Internal rotation behind the back is to the level of sacrum   Forward elevation strength is 5-/5 External rotation strength at the side is 5/5 Internal rotation strength at the side is 5/5 Deltoid strength of anterior, posterior and lateral heads is 5/5   King test is abnormal OBriens test is abnormal Empty can test is abnormal Speeds test is abnormal Cross body adduction test is normal Belly press test is normal Apprehension and relocation is normal Sulcus sign is normal    [Right] : right shoulder [Outside films reviewed] : Outside films reviewed [There are no fractures, subluxations or dislocations. No significant abnormalities are seen] : There are no fractures, subluxations or dislocations. No significant abnormalities are seen

## 2024-03-11 NOTE — DATA REVIEWED
[MRI] : MRI [Right] : of the right [Shoulder] : shoulder [I independently reviewed and interpreted images and report] : I independently reviewed and interpreted images and report [FreeTextEntry1] : full thickness supra/infra tear retracted to joint line, mild to moderate supra atrophy, subscap intact, proximal biceps tendonitis with partial tearing, SLAP tear, type 2 acromion

## 2024-03-11 NOTE — DISCUSSION/SUMMARY
[de-identified] : History, clinical examination and imaging were most consistent with: -   The diagnosis was explained in detail. The potential non-surgical and surgical treatments were reviewed. The relative risks and benefits of each option were considered relative to the patients age, activity level, medical history, symptom severity and previously attempted treatments.   The patient was advised to consult with their primary medical provider prior to initiation of any new medications to reduce the risk of adverse effects specific to their long-term home medications and medical history. The risk of gastrointestinal irritation and kidney injury specific to long-term NSAID use was discussed.   -Discussed options for non-surgical treatment versus surgical intervention for this problem. She is a candidate for RT shoulder RCR, SAD, HANNAH, BT. Discussed postoperative recovery and risk of re-tear Discussed risk of atrophy and tear progression with non-surgical treatment. Discussed that given the size of the tear that a complete repair may not be possible, and in this case a partial repair would be performed. -Patient wishes to avoid surgery at this time and opts to begin a trial of non-surgical treatment. -Patient will proceed with formal PT. -Cortisone injection is discussed and deferred by the patient at this time. -Meloxicam as needed for pain -Follow up in 6 weeks. If symptoms persist will revisit surgical options.    (MDM)   Problem Complexity -Moderate: acute, complicated injury   Risk -Moderate: prescription medication

## 2024-03-11 NOTE — HISTORY OF PRESENT ILLNESS
[de-identified] : Date of initial evaluation is 03/11/2024 Patient age is 69 year  Occupation is retired Body part causing symptoms is the right shoulder Symptoms began 5 months ago she slipped and fell directly on her right arm  Location of pain is lateral Quality of pain is sharp Pain score at rest is 5/10 Pain score during activity is 5/10 Radicular symptoms are not present Prior treatments include Advil  Patient's condition is not associated with workers compensation, no-fault or interscholastic athletics  She was seen by Dr Polo and recommended surgery, she presents for another opinion

## 2024-04-22 ENCOUNTER — APPOINTMENT (OUTPATIENT)
Dept: ORTHOPEDIC SURGERY | Facility: CLINIC | Age: 69
End: 2024-04-22

## 2024-04-25 ENCOUNTER — APPOINTMENT (OUTPATIENT)
Dept: ORTHOPEDIC SURGERY | Facility: AMBULATORY SURGERY CENTER | Age: 69
End: 2024-04-25

## 2024-05-06 ENCOUNTER — APPOINTMENT (OUTPATIENT)
Dept: ORTHOPEDIC SURGERY | Facility: CLINIC | Age: 69
End: 2024-05-06

## 2024-05-21 ENCOUNTER — OFFICE (OUTPATIENT)
Dept: URBAN - METROPOLITAN AREA CLINIC 102 | Facility: CLINIC | Age: 69
Setting detail: OPHTHALMOLOGY
End: 2024-05-21
Payer: MEDICARE

## 2024-05-21 DIAGNOSIS — H25.13: ICD-10-CM

## 2024-05-21 DIAGNOSIS — H43.813: ICD-10-CM

## 2024-05-21 DIAGNOSIS — E11.9: ICD-10-CM

## 2024-05-21 DIAGNOSIS — H47.322: ICD-10-CM

## 2024-05-21 DIAGNOSIS — H49.22: ICD-10-CM

## 2024-05-21 DIAGNOSIS — D32.9: ICD-10-CM

## 2024-05-21 PROCEDURE — 92250 FUNDUS PHOTOGRAPHY W/I&R: CPT | Performed by: OPHTHALMOLOGY

## 2024-05-21 PROCEDURE — 92083 EXTENDED VISUAL FIELD XM: CPT | Performed by: OPHTHALMOLOGY

## 2024-05-21 PROCEDURE — 92014 COMPRE OPH EXAM EST PT 1/>: CPT | Performed by: OPHTHALMOLOGY

## 2024-05-21 ASSESSMENT — CONFRONTATIONAL VISUAL FIELD TEST (CVF)
OS_FINDINGS: FULL
OD_FINDINGS: FULL

## 2024-07-03 ENCOUNTER — APPOINTMENT (OUTPATIENT)
Dept: FAMILY MEDICINE | Facility: CLINIC | Age: 69
End: 2024-07-03

## 2024-07-11 ENCOUNTER — NON-APPOINTMENT (OUTPATIENT)
Age: 69
End: 2024-07-11

## 2024-09-04 ENCOUNTER — RX RENEWAL (OUTPATIENT)
Age: 69
End: 2024-09-04

## 2024-09-19 ENCOUNTER — NON-APPOINTMENT (OUTPATIENT)
Age: 69
End: 2024-09-19

## 2024-09-21 ENCOUNTER — APPOINTMENT (OUTPATIENT)
Dept: FAMILY MEDICINE | Facility: CLINIC | Age: 69
End: 2024-09-21

## 2024-10-28 ENCOUNTER — NON-APPOINTMENT (OUTPATIENT)
Age: 69
End: 2024-10-28

## 2025-06-05 ENCOUNTER — OFFICE (OUTPATIENT)
Dept: URBAN - METROPOLITAN AREA CLINIC 102 | Facility: CLINIC | Age: 70
Setting detail: OPHTHALMOLOGY
End: 2025-06-05
Payer: MEDICARE

## 2025-06-05 DIAGNOSIS — E11.9: ICD-10-CM

## 2025-06-05 DIAGNOSIS — H52.4: ICD-10-CM

## 2025-06-05 DIAGNOSIS — H40.013: ICD-10-CM

## 2025-06-05 DIAGNOSIS — H47.322: ICD-10-CM

## 2025-06-05 DIAGNOSIS — H25.13: ICD-10-CM

## 2025-06-05 PROCEDURE — 92083 EXTENDED VISUAL FIELD XM: CPT | Performed by: OPHTHALMOLOGY

## 2025-06-05 PROCEDURE — 92020 GONIOSCOPY: CPT | Performed by: OPHTHALMOLOGY

## 2025-06-05 PROCEDURE — 92015 DETERMINE REFRACTIVE STATE: CPT | Performed by: OPHTHALMOLOGY

## 2025-06-05 PROCEDURE — 76514 ECHO EXAM OF EYE THICKNESS: CPT | Performed by: OPHTHALMOLOGY

## 2025-06-05 PROCEDURE — 92014 COMPRE OPH EXAM EST PT 1/>: CPT | Performed by: OPHTHALMOLOGY

## 2025-06-05 PROCEDURE — 92133 CPTRZD OPH DX IMG PST SGM ON: CPT | Performed by: OPHTHALMOLOGY

## 2025-06-05 ASSESSMENT — REFRACTION_CURRENTRX
OD_SPHERE: +3.75
OD_AXIS: 060
OS_SPHERE: +2.75
OD_VPRISM_DIRECTION: SV
OS_CYLINDER: -1.00
OD_VPRISM_DIRECTION: SV
OD_CYLINDER: -0.50
OS_CYLINDER: -0.25
OS_VPRISM_DIRECTION: SV
OD_SPHERE: +1.50
OD_CYLINDER: -0.50
OS_SPHERE: +1.25
OD_OVR_VA: 20/
OS_SPHERE: +1.25
OS_AXIS: 080
OD_VPRISM_DIRECTION: SV
OS_ADD: +2.25
OD_OVR_VA: 20/
OD_SPHERE: +3.75
OD_OVR_VA: 20/
OD_AXIS: 053
OS_CYLINDER: -1.00
OS_SPHERE: +3.50
OD_AXIS: 61
OS_CYLINDER: -1.00
OS_OVR_VA: 20/
OD_SPHERE: +1.50
OS_VPRISM_DIRECTION: SV
OD_CYLINDER: -0.50
OS_OVR_VA: 20/
OS_OVR_VA: 20/
OS_AXIS: 076
OS_AXIS: 69
OS_VPRISM_DIRECTION: SV
OD_CYLINDER: -1.00
OD_AXIS: 061
OD_ADD: +2.25
OS_AXIS: 074

## 2025-06-05 ASSESSMENT — REFRACTION_MANIFEST
OD_AXIS: 090
OD_VA1: 20/25-
OS_AXIS: 097
OS_AXIS: 097
OS_VA1: 20/25-
OD_SPHERE: +1.75
OD_SPHERE: +2.75
OD_ADD: +2.50
OS_CYLINDER: -1.75
OS_CYLINDER: -1.50
OD_CYLINDER: -1.00
OS_SPHERE: +2.75
OS_ADD: +2.50
OS_VA1: 20/25
OD_CYLINDER: -1.25
OD_VA1: 20/25
OS_SPHERE: +2.50
OD_AXIS: 081

## 2025-06-05 ASSESSMENT — KERATOMETRY
OD_K1POWER_DIOPTERS: 44.00
OD_K2POWER_DIOPTERS: 44.75
METHOD_AUTO_MANUAL: AUTO
OS_K1POWER_DIOPTERS: 43.25
OD_AXISANGLE_DEGREES: 016
OS_AXISANGLE_DEGREES: 162
OS_K2POWER_DIOPTERS: 44.75

## 2025-06-05 ASSESSMENT — REFRACTION_AUTOREFRACTION
OS_AXIS: 097
OS_CYLINDER: -1.75
OS_SPHERE: +3.00
OD_AXIS: 090
OD_CYLINDER: -1.50
OD_SPHERE: +3.00

## 2025-06-05 ASSESSMENT — VISUAL ACUITY
OD_BCVA: 20/25-2
OS_BCVA: 20/25+2

## 2025-06-05 ASSESSMENT — PACHYMETRY
OD_CT_UM: 572
OS_CT_UM: 599
OS_CT_CORRECTION: -4
OD_CT_CORRECTION: -2

## 2025-06-05 ASSESSMENT — CONFRONTATIONAL VISUAL FIELD TEST (CVF)
OS_FINDINGS: FULL
OD_FINDINGS: FULL

## 2025-07-15 ENCOUNTER — OFFICE (OUTPATIENT)
Dept: URBAN - METROPOLITAN AREA CLINIC 102 | Facility: CLINIC | Age: 70
Setting detail: OPHTHALMOLOGY
End: 2025-07-15
Payer: MEDICARE

## 2025-07-15 DIAGNOSIS — H40.013: ICD-10-CM

## 2025-07-15 PROCEDURE — 99213 OFFICE O/P EST LOW 20 MIN: CPT | Performed by: OPHTHALMOLOGY

## 2025-07-15 ASSESSMENT — REFRACTION_MANIFEST
OD_CYLINDER: -1.00
OS_AXIS: 097
OD_AXIS: 090
OD_CYLINDER: -1.25
OD_SPHERE: +1.75
OS_VA1: 20/25
OD_AXIS: 081
OD_VA1: 20/25
OD_SPHERE: +2.75
OD_ADD: +2.50
OS_CYLINDER: -1.50
OD_VA1: 20/25-
OS_SPHERE: +2.50
OS_CYLINDER: -1.75
OS_AXIS: 097
OS_VA1: 20/25-
OS_ADD: +2.50
OS_SPHERE: +2.75

## 2025-07-15 ASSESSMENT — KERATOMETRY
OD_K1POWER_DIOPTERS: 44.00
METHOD_AUTO_MANUAL: AUTO
OD_K2POWER_DIOPTERS: 44.75
OS_AXISANGLE_DEGREES: 140
OS_K2POWER_DIOPTERS: 45.00
OD_AXISANGLE_DEGREES: 33
OS_K1POWER_DIOPTERS: 44.00

## 2025-07-15 ASSESSMENT — REFRACTION_AUTOREFRACTION
OD_AXIS: 88
OS_CYLINDER: -1.25
OD_CYLINDER: -1.25
OS_AXIS: 95
OS_SPHERE: +2.25
OD_SPHERE: +2.75

## 2025-07-15 ASSESSMENT — REFRACTION_CURRENTRX
OS_OVR_VA: 20/
OD_AXIS: 61
OS_CYLINDER: -1.00
OD_SPHERE: +1.50
OS_OVR_VA: 20/
OD_CYLINDER: -1.00
OS_SPHERE: +2.75
OD_SPHERE: +3.75
OD_CYLINDER: -0.50
OS_SPHERE: +3.50
OD_AXIS: 061
OS_ADD: +2.25
OD_OVR_VA: 20/
OD_VPRISM_DIRECTION: SV
OD_CYLINDER: -0.50
OS_CYLINDER: -1.00
OD_VPRISM_DIRECTION: SV
OD_SPHERE: +3.75
OS_AXIS: 074
OS_SPHERE: +1.25
OS_CYLINDER: -0.25
OS_AXIS: 080
OD_AXIS: 060
OD_SPHERE: +1.50
OD_OVR_VA: 20/
OS_VPRISM_DIRECTION: SV
OD_ADD: +2.25
OS_SPHERE: +1.25
OD_AXIS: 053
OD_CYLINDER: -0.50
OS_AXIS: 69
OS_VPRISM_DIRECTION: SV
OS_OVR_VA: 20/
OD_VPRISM_DIRECTION: SV
OS_VPRISM_DIRECTION: SV
OS_AXIS: 076
OS_CYLINDER: -1.00
OD_OVR_VA: 20/

## 2025-07-15 ASSESSMENT — CONFRONTATIONAL VISUAL FIELD TEST (CVF)
OD_FINDINGS: FULL
OS_FINDINGS: FULL

## 2025-07-15 ASSESSMENT — PACHYMETRY
OS_CT_UM: 599
OD_CT_CORRECTION: -2
OD_CT_UM: 572
OS_CT_CORRECTION: -4

## 2025-07-15 ASSESSMENT — VISUAL ACUITY
OD_BCVA: 20/30
OS_BCVA: 20/25-1

## 2025-07-21 ENCOUNTER — OUTPATIENT (OUTPATIENT)
Dept: OUTPATIENT SERVICES | Facility: HOSPITAL | Age: 70
LOS: 1 days | End: 2025-07-21
Payer: MEDICARE

## 2025-07-21 VITALS
HEIGHT: 65 IN | WEIGHT: 145.28 LBS | RESPIRATION RATE: 16 BRPM | SYSTOLIC BLOOD PRESSURE: 132 MMHG | OXYGEN SATURATION: 98 % | HEART RATE: 79 BPM | DIASTOLIC BLOOD PRESSURE: 77 MMHG | TEMPERATURE: 99 F

## 2025-07-21 DIAGNOSIS — Z01.818 ENCOUNTER FOR OTHER PREPROCEDURAL EXAMINATION: ICD-10-CM

## 2025-07-21 DIAGNOSIS — Z90.13 ACQUIRED ABSENCE OF BILATERAL BREASTS AND NIPPLES: Chronic | ICD-10-CM

## 2025-07-21 DIAGNOSIS — Z98.890 OTHER SPECIFIED POSTPROCEDURAL STATES: Chronic | ICD-10-CM

## 2025-07-21 DIAGNOSIS — Z90.89 ACQUIRED ABSENCE OF OTHER ORGANS: Chronic | ICD-10-CM

## 2025-07-21 LAB
A1C WITH ESTIMATED AVERAGE GLUCOSE RESULT: 8.6 % — HIGH (ref 4–5.6)
ANION GAP SERPL CALC-SCNC: 5 MMOL/L — SIGNIFICANT CHANGE UP (ref 5–17)
BASOPHILS # BLD AUTO: 0.07 K/UL — SIGNIFICANT CHANGE UP (ref 0–0.2)
BASOPHILS NFR BLD AUTO: 1 % — SIGNIFICANT CHANGE UP (ref 0–2)
BUN SERPL-MCNC: 13 MG/DL — SIGNIFICANT CHANGE UP (ref 7–23)
CALCIUM SERPL-MCNC: 9.7 MG/DL — SIGNIFICANT CHANGE UP (ref 8.5–10.1)
CHLORIDE SERPL-SCNC: 108 MMOL/L — SIGNIFICANT CHANGE UP (ref 96–108)
CO2 SERPL-SCNC: 25 MMOL/L — SIGNIFICANT CHANGE UP (ref 22–31)
CREAT SERPL-MCNC: 0.54 MG/DL — SIGNIFICANT CHANGE UP (ref 0.5–1.3)
EGFR: 98 ML/MIN/1.73M2 — SIGNIFICANT CHANGE UP
EGFR: 98 ML/MIN/1.73M2 — SIGNIFICANT CHANGE UP
EOSINOPHIL # BLD AUTO: 0.27 K/UL — SIGNIFICANT CHANGE UP (ref 0–0.5)
EOSINOPHIL NFR BLD AUTO: 3.9 % — SIGNIFICANT CHANGE UP (ref 0–6)
ESTIMATED AVERAGE GLUCOSE: 200 MG/DL — HIGH (ref 68–114)
GLUCOSE SERPL-MCNC: 139 MG/DL — HIGH (ref 70–99)
HCT VFR BLD CALC: 42.1 % — SIGNIFICANT CHANGE UP (ref 34.5–45)
HGB BLD-MCNC: 14 G/DL — SIGNIFICANT CHANGE UP (ref 11.5–15.5)
IMM GRANULOCYTES # BLD AUTO: 0.02 K/UL — SIGNIFICANT CHANGE UP (ref 0–0.07)
IMM GRANULOCYTES NFR BLD AUTO: 0.3 % — SIGNIFICANT CHANGE UP (ref 0–0.9)
LYMPHOCYTES # BLD AUTO: 2.21 K/UL — SIGNIFICANT CHANGE UP (ref 1–3.3)
LYMPHOCYTES NFR BLD AUTO: 32.2 % — SIGNIFICANT CHANGE UP (ref 13–44)
MCHC RBC-ENTMCNC: 30.8 PG — SIGNIFICANT CHANGE UP (ref 27–34)
MCHC RBC-ENTMCNC: 33.3 G/DL — SIGNIFICANT CHANGE UP (ref 32–36)
MCV RBC AUTO: 92.7 FL — SIGNIFICANT CHANGE UP (ref 80–100)
MONOCYTES # BLD AUTO: 0.46 K/UL — SIGNIFICANT CHANGE UP (ref 0–0.9)
MONOCYTES NFR BLD AUTO: 6.7 % — SIGNIFICANT CHANGE UP (ref 2–14)
NEUTROPHILS # BLD AUTO: 3.84 K/UL — SIGNIFICANT CHANGE UP (ref 1.8–7.4)
NEUTROPHILS NFR BLD AUTO: 55.9 % — SIGNIFICANT CHANGE UP (ref 43–77)
NRBC # BLD AUTO: 0 K/UL — SIGNIFICANT CHANGE UP (ref 0–0)
NRBC # FLD: 0 K/UL — SIGNIFICANT CHANGE UP (ref 0–0)
NRBC BLD AUTO-RTO: 0 /100 WBCS — SIGNIFICANT CHANGE UP (ref 0–0)
PLATELET # BLD AUTO: 354 K/UL — SIGNIFICANT CHANGE UP (ref 150–400)
PMV BLD: 9.6 FL — SIGNIFICANT CHANGE UP (ref 7–13)
POTASSIUM SERPL-MCNC: 4.1 MMOL/L — SIGNIFICANT CHANGE UP (ref 3.5–5.3)
POTASSIUM SERPL-SCNC: 4.1 MMOL/L — SIGNIFICANT CHANGE UP (ref 3.5–5.3)
RBC # BLD: 4.54 M/UL — SIGNIFICANT CHANGE UP (ref 3.8–5.2)
RBC # FLD: 12.6 % — SIGNIFICANT CHANGE UP (ref 10.3–14.5)
SODIUM SERPL-SCNC: 138 MMOL/L — SIGNIFICANT CHANGE UP (ref 135–145)
WBC # BLD: 6.87 K/UL — SIGNIFICANT CHANGE UP (ref 3.8–10.5)
WBC # FLD AUTO: 6.87 K/UL — SIGNIFICANT CHANGE UP (ref 3.8–10.5)

## 2025-07-21 PROCEDURE — 93010 ELECTROCARDIOGRAM REPORT: CPT

## 2025-07-21 PROCEDURE — 85025 COMPLETE CBC W/AUTO DIFF WBC: CPT

## 2025-07-21 PROCEDURE — 80048 BASIC METABOLIC PNL TOTAL CA: CPT

## 2025-07-21 PROCEDURE — 99214 OFFICE O/P EST MOD 30 MIN: CPT | Mod: 25

## 2025-07-21 PROCEDURE — 83036 HEMOGLOBIN GLYCOSYLATED A1C: CPT

## 2025-07-21 PROCEDURE — 93005 ELECTROCARDIOGRAM TRACING: CPT

## 2025-07-21 PROCEDURE — 36415 COLL VENOUS BLD VENIPUNCTURE: CPT

## 2025-07-21 RX ORDER — FOLIC ACID 1 MG/1
0 TABLET ORAL
Refills: 0 | DISCHARGE

## 2025-07-21 RX ORDER — METFORMIN HYDROCHLORIDE 850 MG/1
1 TABLET ORAL
Refills: 0 | DISCHARGE

## 2025-07-21 RX ORDER — VENLAFAXINE HYDROCHLORIDE 37.5 MG/1
1 CAPSULE, EXTENDED RELEASE ORAL
Refills: 0 | DISCHARGE

## 2025-07-21 RX ORDER — METOPROLOL SUCCINATE 50 MG/1
1 TABLET, EXTENDED RELEASE ORAL
Refills: 0 | DISCHARGE

## 2025-07-21 RX ORDER — FOLIC ACID 1 MG/1
1 TABLET ORAL
Refills: 0 | DISCHARGE

## 2025-07-21 RX ORDER — LATANOPROST PF 0.05 MG/ML
1 SOLUTION/ DROPS OPHTHALMIC
Refills: 0 | DISCHARGE

## 2025-07-22 DIAGNOSIS — Z01.818 ENCOUNTER FOR OTHER PREPROCEDURAL EXAMINATION: ICD-10-CM

## 2025-08-26 ENCOUNTER — OFFICE (OUTPATIENT)
Dept: URBAN - METROPOLITAN AREA CLINIC 102 | Facility: CLINIC | Age: 70
Setting detail: OPHTHALMOLOGY
End: 2025-08-26
Payer: MEDICARE

## 2025-08-26 ENCOUNTER — RX ONLY (RX ONLY)
Age: 70
End: 2025-08-26

## 2025-08-26 DIAGNOSIS — H40.013: ICD-10-CM

## 2025-08-26 PROCEDURE — 99213 OFFICE O/P EST LOW 20 MIN: CPT | Performed by: OPHTHALMOLOGY

## 2025-08-26 ASSESSMENT — REFRACTION_MANIFEST
OD_CYLINDER: -1.00
OD_AXIS: 081
OS_VA1: 20/25
OD_VA1: 20/25
OD_ADD: +2.50
OS_CYLINDER: -1.50
OD_VA1: 20/25-
OS_ADD: +2.50
OS_AXIS: 097
OD_SPHERE: +1.75
OD_AXIS: 090
OD_CYLINDER: -1.25
OS_VA1: 20/25-
OS_AXIS: 097
OS_CYLINDER: -1.75
OD_SPHERE: +2.75
OS_SPHERE: +2.75
OS_SPHERE: +2.50

## 2025-08-26 ASSESSMENT — KERATOMETRY
OS_AXISANGLE_DEGREES: 146
OD_K2POWER_DIOPTERS: 44.75
OD_K1POWER_DIOPTERS: 44.00
METHOD_AUTO_MANUAL: AUTO
OD_AXISANGLE_DEGREES: 22
OS_K2POWER_DIOPTERS: 45.00
OS_K1POWER_DIOPTERS: 44.00

## 2025-08-26 ASSESSMENT — REFRACTION_CURRENTRX
OD_OVR_VA: 20/
OS_AXIS: 074
OD_OVR_VA: 20/
OS_SPHERE: +2.75
OS_VPRISM_DIRECTION: SV
OD_SPHERE: +1.50
OS_AXIS: 69
OD_AXIS: 061
OS_OVR_VA: 20/
OD_AXIS: 053
OS_VPRISM_DIRECTION: SV
OS_VPRISM_DIRECTION: SV
OD_SPHERE: +1.50
OS_CYLINDER: -1.00
OD_AXIS: 61
OD_AXIS: 060
OD_CYLINDER: -0.50
OD_OVR_VA: 20/
OS_CYLINDER: -0.25
OD_ADD: +2.25
OS_CYLINDER: -1.00
OD_CYLINDER: -0.50
OS_AXIS: 080
OD_VPRISM_DIRECTION: SV
OD_CYLINDER: -1.00
OS_OVR_VA: 20/
OD_SPHERE: +3.75
OD_VPRISM_DIRECTION: SV
OS_SPHERE: +1.25
OS_SPHERE: +1.25
OS_SPHERE: +3.50
OD_CYLINDER: -0.50
OD_SPHERE: +3.75
OD_VPRISM_DIRECTION: SV
OS_AXIS: 076
OS_OVR_VA: 20/
OS_ADD: +2.25
OS_CYLINDER: -1.00

## 2025-08-26 ASSESSMENT — TONOMETRY
OS_IOP_MMHG: 19
OD_IOP_MMHG: 17

## 2025-08-26 ASSESSMENT — PACHYMETRY
OS_CT_CORRECTION: -4
OD_CT_UM: 572
OD_CT_CORRECTION: -2
OS_CT_UM: 599

## 2025-08-26 ASSESSMENT — CONFRONTATIONAL VISUAL FIELD TEST (CVF)
OD_FINDINGS: FULL
OS_FINDINGS: FULL

## 2025-08-26 ASSESSMENT — REFRACTION_AUTOREFRACTION
OS_AXIS: 92
OD_SPHERE: +3.00
OD_CYLINDER: -1.25
OS_CYLINDER: -1.25
OD_AXIS: 89
OS_SPHERE: +2.50

## 2025-08-26 ASSESSMENT — VISUAL ACUITY
OD_BCVA: 20/30
OS_BCVA: 20/30